# Patient Record
(demographics unavailable — no encounter records)

---

## 2024-12-06 NOTE — PHYSICAL EXAM
[Alert] : alert [No Acute Distress] : no acute distress [Well Developed] : well developed [Normal Sclera/Conjunctiva] : normal sclera/conjunctiva [EOMI] : extra ocular movement intact [No Proptosis] : no proptosis [No Lid Lag] : no lid lag [No LAD] : no lymphadenopathy [Supple] : the neck was supple [Thyroid Not Enlarged] : the thyroid was not enlarged [No Thyroid Nodules] : no palpable thyroid nodules [No Respiratory Distress] : no respiratory distress [No Accessory Muscle Use] : no accessory muscle use [Normal Rate and Effort] : normal respiratory rate and effort [Normal Gait] : normal gait [No Involuntary Movements] : no involuntary movements were seen [No Tremors] : no tremors [Oriented x3] : oriented to person, place, and time [Normal Insight/Judgement] : insight and judgment were intact

## 2024-12-11 NOTE — HISTORY OF PRESENT ILLNESS
[Continuous Glucose Monitoring] : Continuous Glucose Monitoring: Yes [Dexcom] : Dexcom [FreeTextEntry1] : NICKY HAYS is a 56 yo female with past medical history of bilateral PE (on anticoalition), vertigo, fibromyalgia, type 2 DM who presents for diabetes management   Patient was diagnosed with diabetes over 5 years ago, had gestational DM and used insulin many years ago. . Tried Saxenda but stopped many years ago and noted in Metformin 100mg po BID Tried Go Lo diet and lost 25lbs in 2023.   Patient checks fingerstick glucose 3x/day Last visit with ophthalmologist for annual diabetic eye screening-due [FreeTextEntry2] : 96 [FreeTextEntry3] : 4+0 [FreeTextEntry4] : 0+0 [de-identified] : 6.3 [FreeTextEntry5] : 126 [FreeTextEntry7] : 26

## 2024-12-11 NOTE — HISTORY OF PRESENT ILLNESS
[Continuous Glucose Monitoring] : Continuous Glucose Monitoring: Yes [Dexcom] : Dexcom [FreeTextEntry1] : NICKY HAYS is a 56 yo female with past medical history of bilateral PE (on anticoalition), vertigo, fibromyalgia, type 2 DM who presents for diabetes management   Patient was diagnosed with diabetes over 5 years ago, had gestational DM and used insulin many years ago. . Tried Saxenda but stopped many years ago and noted in Metformin 100mg po BID Tried Go Lo diet and lost 25lbs in 2023.   Patient checks fingerstick glucose 3x/day Last visit with ophthalmologist for annual diabetic eye screening-due [FreeTextEntry2] : 96 [FreeTextEntry3] : 4+0 [FreeTextEntry4] : 0+0 [de-identified] : 6.3 [FreeTextEntry5] : 126 [FreeTextEntry7] : 26

## 2024-12-11 NOTE — ASSESSMENT
[Diabetes Foot Care] : diabetes foot care [Long Term Vascular Complications] : long term vascular complications of diabetes [Carbohydrate Consistent Diet] : carbohydrate consistent diet [Importance of Diet and Exercise] : importance of diet and exercise to improve glycemic control, achieve weight loss and improve cardiovascular health [Hypoglycemia Management] : hypoglycemia management [Self Monitoring of Blood Glucose] : self monitoring of blood glucose [Injection Technique, Storage, Sharps Disposal] : injection technique, storage, and sharps disposal [Retinopathy Screening] : Patient was referred to ophthalmology for retinopathy screening [Weight Loss] : weight loss [Diabetic Medications] : Risks and benefits of diabetic medications were discussed [FreeTextEntry1] : Type 2 DM,stable POC HgbA1c today is 5.6%, much improved and at goal. Lost 15lbs since last office visit Reviewed DEXCOM sensor tracing, noted TIR 96%, 4% high and 1% lows Continue Semglee 15U at bedtime Increase Mounjaro 10mg weekly D/C Metformin  She is taking Zetia for hyperlipidemia, not on statin (declined due to h/o fibryomyalgia). On Losartan for HTN/renal protection Bloodwork was collected in office today, will f/u results accordingly.   Answered all questions today; patient verbalized understanding of the above RTO in 6 months w/ CDe while I am out on maternity leave

## 2025-05-01 NOTE — HISTORY OF PRESENT ILLNESS
[FreeTextEntry1] :  57 year old  postmenopausal female since 2019 presents with vaginal lesion. Last seen in May 2022 by MG. Has HTN and DM. H/o 1 prior c/s.  Last mammo on file was in 2022. Reports having uncomfortable vaginal lesion for the past 2 weeks. Denies vaginal discharge and odor. Denies urinary sxs. Denies h/o genital herpes.   OBHx: c/s x1 (), MAB x1 () GYNHx: polyps  PMHx: DM, HTN, diverticulosis, arthritis PSHx: c/s x1, T&A, double knee replacement, appendectomy  Allergies: Sulfur, PCN, Morphine [PapSmeardate] : 5/2022

## 2025-05-01 NOTE — END OF VISIT
[FreeTextEntry3] :  I, Katelyn Rome, acted as a scribe on behalf of Dr. Telma Cortes M.D. on 05/01/2025.   All medical entries made by the scribe were at my, Dr. Telma Cortes M.D., direction and personally dictated by me on 05/01/2025. I have reviewed the chart and agree that the record accurately reflects my personal performance of the history, physical exam, assessment and plan. I have also personally directed, reviewed, and agreed with the chart.

## 2025-05-01 NOTE — PLAN
[FreeTextEntry1] :  57 year old  postmenopausal female since 2019 presents with vaginal lesion.  -HSV cx -Advised Aquaphor to area  F/u for annual.

## 2025-05-01 NOTE — PHYSICAL EXAM
[Appropriately responsive] : appropriately responsive [Alert] : alert [No Acute Distress] : no acute distress [Oriented x3] : oriented x3 [Labia Majora] : normal [Labia Minora] : normal [Normal] : normal [FreeTextEntry2] : Dana Colletto [FreeTextEntry1] : NP [FreeTextEntry4] : ulceration at posterior introitus